# Patient Record
Sex: FEMALE | Race: WHITE
[De-identification: names, ages, dates, MRNs, and addresses within clinical notes are randomized per-mention and may not be internally consistent; named-entity substitution may affect disease eponyms.]

---

## 2017-07-25 ENCOUNTER — HOSPITAL ENCOUNTER (OUTPATIENT)
Dept: HOSPITAL 45 - C.MAMM | Age: 77
Discharge: HOME | End: 2017-07-25
Attending: INTERNAL MEDICINE
Payer: COMMERCIAL

## 2017-07-25 DIAGNOSIS — M85.852: ICD-10-CM

## 2017-07-25 DIAGNOSIS — C50.911: Primary | ICD-10-CM

## 2018-07-27 ENCOUNTER — HOSPITAL ENCOUNTER (OUTPATIENT)
Dept: HOSPITAL 45 - C.MAMM | Age: 78
Discharge: HOME | End: 2018-07-27
Attending: INTERNAL MEDICINE
Payer: COMMERCIAL

## 2018-07-27 DIAGNOSIS — Z12.31: Primary | ICD-10-CM

## 2018-07-27 DIAGNOSIS — Z85.3: ICD-10-CM

## 2018-07-27 NOTE — MAMMOGRAPHY REPORT
BILATERAL DIGITAL DIAGNOSTIC MAMMOGRAM TOMOSYNTHESIS WITH CAD: 7/27/2018

CLINICAL HISTORY: Personal history of breast cancer. Asymptomatic.  





TECHNIQUE: The study was acquired using full field digital technology and interpreted from soft copy.
 Breast tomosynthesis in addition to standard 2D mammography was performed. Current study was also ev
aluated with a Computer Aided Detection (CAD) system.  Bilateral CC and MLO 2D and tomosynthesis imag
es were obtained.



COMPARISON: Comparison is made to exams dated:  6/8/2016 mammogram, 7/5/2017 mammogram, 6/3/2015 mamm
ogram, 12/11/2014 mammogram, 12/20/2013 mammogram, and 6/3/2013 mammogram.   

BREAST COMPOSITION: The tissue of both breasts is heterogeneously dense, which may obscure small mass
es.  



FINDINGS: 

No suspicious masses, calcifications, or areas of architectural distortion are noted in either breast
. There has been no significant interval change compared to prior exams.  There are stable postsurgic
al changes in the left upper outer quadrant posteriorly from prior lumpectomy, including stable archi
tectural distortion and surgical clips at the lumpectomy bed.  A linear scar marker overlies the left
 upper outer breast.



The remainder of both breasts are stable compared to prior exams, without suspicious masses, calcific
ations, or areas of architectural distortion noted.  Scattered bilateral benign-appearing calcificati
ons are not significantly changed.  



IMPRESSION: ACR BI-RADS CATEGORY 2: BENIGN

There is no mammographic evidence of malignancy in either breast. A 1 year screening mammogram is rec
ommended.(07/28/2019)  The patient has been verbally notified of the results.  





Some breast cancers are not detected with mammography. A negative mammographic report should not xavi
y biopsy if a clinically suggestive mass is present.



Dasha Parker M.D.          

ah/:7/27/2018 11:51:59  



Imaging Technologist: RT Nay([email or web address])(M), [facility]

letter sent: Normal 1/2  

BI-RADS Code: ACR BI-RADS Category 2: Benign